# Patient Record
Sex: MALE | Race: OTHER | HISPANIC OR LATINO | URBAN - METROPOLITAN AREA
[De-identification: names, ages, dates, MRNs, and addresses within clinical notes are randomized per-mention and may not be internally consistent; named-entity substitution may affect disease eponyms.]

---
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
## 2024-04-02 NOTE — ED PEDIATRIC NURSE NOTE - JUGULAR VENOUS DISTENTION
Detail Level: Zone Additional Notes: - 4/2/24 VIKOR SWAB WAS DONE IN OFFICE TODAY Render Risk Assessment In Note?: yes absent